# Patient Record
Sex: FEMALE | URBAN - METROPOLITAN AREA
[De-identification: names, ages, dates, MRNs, and addresses within clinical notes are randomized per-mention and may not be internally consistent; named-entity substitution may affect disease eponyms.]

---

## 2022-10-18 ENCOUNTER — APPOINTMENT (OUTPATIENT)
Dept: URBAN - METROPOLITAN AREA CLINIC 265 | Age: 37
Setting detail: DERMATOLOGY
End: 2022-10-26

## 2022-10-18 DIAGNOSIS — L98.8 OTHER SPECIFIED DISORDERS OF THE SKIN AND SUBCUTANEOUS TISSUE: ICD-10-CM

## 2022-10-18 PROCEDURE — OTHER ADDITIONAL NOTES: OTHER

## 2022-10-18 NOTE — PROCEDURE: ADDITIONAL NOTES
Additional Notes: [employer] units lot number [id number] exp 11/2024 Additional Notes: [employer] units lot number [id number] exp 11/2024

## 2022-10-31 ENCOUNTER — APPOINTMENT (OUTPATIENT)
Dept: URBAN - METROPOLITAN AREA CLINIC 265 | Age: 37
Setting detail: DERMATOLOGY
End: 2022-10-31

## 2022-10-31 DIAGNOSIS — Z41.9 ENCOUNTER FOR PROCEDURE FOR PURPOSES OTHER THAN REMEDYING HEALTH STATE, UNSPECIFIED: ICD-10-CM

## 2022-10-31 PROCEDURE — OTHER ADDITIONAL NOTES: OTHER

## 2022-10-31 NOTE — PROCEDURE: ADDITIONAL NOTES
Render Risk Assessment In Note?: no
Detail Level: Simple
Additional Notes: Good outcome photos taken.

## 2022-12-14 ENCOUNTER — APPOINTMENT (OUTPATIENT)
Dept: URBAN - METROPOLITAN AREA CLINIC 265 | Age: 37
Setting detail: DERMATOLOGY
End: 2022-12-31

## 2022-12-14 DIAGNOSIS — L98.8 OTHER SPECIFIED DISORDERS OF THE SKIN AND SUBCUTANEOUS TISSUE: ICD-10-CM

## 2022-12-14 PROCEDURE — OTHER ADDITIONAL NOTES: OTHER

## 2022-12-14 NOTE — PROCEDURE: ADDITIONAL NOTES
Additional Notes: [employer] units lot [id number] exp 2025
Render Risk Assessment In Note?: no
Detail Level: Simple

## 2023-02-17 ENCOUNTER — APPOINTMENT (OUTPATIENT)
Dept: URBAN - METROPOLITAN AREA CLINIC 265 | Age: 38
Setting detail: DERMATOLOGY
End: 2023-02-20

## 2023-02-17 DIAGNOSIS — L98.8 OTHER SPECIFIED DISORDERS OF THE SKIN AND SUBCUTANEOUS TISSUE: ICD-10-CM

## 2023-02-17 PROCEDURE — OTHER ADDITIONAL NOTES: OTHER

## 2023-02-17 NOTE — PROCEDURE: ADDITIONAL NOTES
Additional Notes: [employer] units lot number [id number] exp 04/2025 Additional Notes: [employer] units lot number [id number] exp 04/2025